# Patient Record
Sex: MALE | Race: AMERICAN INDIAN OR ALASKA NATIVE
[De-identification: names, ages, dates, MRNs, and addresses within clinical notes are randomized per-mention and may not be internally consistent; named-entity substitution may affect disease eponyms.]

---

## 2019-03-20 ENCOUNTER — HOSPITAL ENCOUNTER (EMERGENCY)
Dept: HOSPITAL 42 - ED | Age: 69
Discharge: HOME | End: 2019-03-20
Payer: MEDICARE

## 2019-03-20 VITALS
SYSTOLIC BLOOD PRESSURE: 165 MMHG | OXYGEN SATURATION: 97 % | RESPIRATION RATE: 18 BRPM | HEART RATE: 81 BPM | TEMPERATURE: 98.1 F | DIASTOLIC BLOOD PRESSURE: 87 MMHG

## 2019-03-20 VITALS — BODY MASS INDEX: 29.5 KG/M2

## 2019-03-20 DIAGNOSIS — R04.0: Primary | ICD-10-CM

## 2019-03-20 NOTE — ED PDOC
Arrival/HPI





- General


Chief Complaint: ENT Problem


Time Seen by Provider: 03/20/19 15:48


Historian: Patient





- History of Present Illness


Narrative History of Present Illness (Text): 





03/20/19 15:48 


Ishan Grullon is a 68 year old male, with a past medical history of CAD S/P CABG

36 years ago, multiple stents in the past several years, diabetes, hypertension,

and nosebleeds who presents to the emergency department complaining of nosebleed

since 1 hour. Patient appreciates majority of bleeding right in nostril. Patient

states he was sitting at his optometrist's office when nosebleed began after 

coughing. Patient notes dry and warm air in the office. Patient states current 

nosebleed is worse than previous. Patient currently takes clopidogrel and 

aspirin 81mg. Patient denies chest pain, shortness of breath, abdominal pain, 

nausea, vomiting, diarrhea, or any other complaint. 





Time/Duration: 1 hour


Symptom Course: Unchanged


Activities at Onset: Light


Context: Sitting, Other





Past Medical History





- Provider Review


Nursing Documentation Reviewed: Yes





- Infectious Disease


Hx of Infectious Diseases: None





- Cardiac


Hx MI: Yes


Hx Hypertension: Yes





- Neurological


Hx Seizures: No





- Endocrine/Metabolic


Hx Diabetes Mellitus Type 2: Yes





- Musculoskeletal/Rheumatological


Hx Falls: No





- Psychiatric


Hx Substance Use: No





- Surgical History


Hx Coronary Artery Bypass Graft: Yes


Other/Comment: Knee Sx





- Anesthesia


Hx Anesthesia: Yes





- Suicidal Assessment


Feels Threatened In Home Enviroment: No





Family/Social History





- Physician Review


Nursing Documentation Reviewed: Yes


Family/Social History: No Known Family HX


Smoking Status: Never Smoked


Hx Alcohol Use: No


Hx Substance Use: No





Allergies/Home Meds


Allergies/Adverse Reactions: 


Allergies





No Known Allergies Allergy (Verified 03/20/19 15:23)


   








Home Medications: 


                                    Home Meds











 Medication  Instructions  Recorded  Confirmed


 


Aspirin [Ecotrin] 81 mg PO DAILY 01/09/17 03/20/19


 


Clopidogrel [Plavix] 75 mg PO DAILY 01/09/17 03/20/19


 


Diltiazem HCl [Cardizem LA] 180 mg PO QPM 01/09/17 03/20/19


 


Enalapril Maleate [Vasotec] 20 mg PO BID 01/09/17 03/20/19


 


Metoprolol Succinate XL [Toprol XL] 50 mg PO BID 01/09/17 03/20/19


 


Rosuvastatin Calcium [Crestor] 10 mg PO QPM 01/09/17 03/20/19


 


metFORMIN [glucOPHAGE] 500 mg PO BID 01/09/17 03/20/19














Review of Systems





- Physician Review


All systems were reviewed & negative as marked: Yes





- Review of Systems


ENT: Epistaxis


Respiratory: absent: SOB


Cardiovascular: absent: Chest Pain





Physical Exam





- Physical Exam


Narrative Physical Exam (Text): 





03/20/19 15:48 


Constitutional: No acute distress.


Head: Normocephalic.  Atraumatic.  


Eyes:  PERRL.


ENT:  active bleeding in right nostril


Neck:  Supple.


Cardiovascular:  Regular rate.


Chest: No tenderness.


Respiratory:  Clear to auscultation bilaterally.


GI:  Soft. Nontender. Nondistended. 


Back:  No CVA tenderness.


Musculoskeletal:  No tenderness or swelling of extremities.


Skin:  No rash. 


Neurologic:  Alert, no focal deficit. 





Vital Signs Reviewed: Yes





Vital Signs











  Temp Pulse Resp BP Pulse Ox


 


 03/20/19 15:13  98.1 F  81  18  165/87 H  97











Temperature: Afebrile


Blood Pressure: Hypertensive


Pulse: Regular


Respiratory Rate: Normal


Appearance: Positive for: Well-Appearing, Non-Toxic, Comfortable


Pain Distress: None


Mental Status: Positive for: Alert and Oriented X 3





Medical Decision Making


ED Course and Treatment: 





03/20/19 15:48 


Impression: Patient is a 68 year old male with a past medical history of CABG, 

multiple stents, diabetes, hypertension, and nosebleeds who presents to the 

emergency department for nosebleed since one hour ago. Pt 











Progress Notes:


03/20/19 16:10


Bleeding relieved w/ pressure. Observe patient to follow up for any other 

bleeding. 





Bleeding continued. Rhinorocket placed. 





03/20/19 16:47


PROCEDURE: EPISTAXIS MANAGEMENT


Performed by the Pascual Quintero


Consent: Informed consent was obtained after discussion of the risks, benefits, 

and alternatives to


the procedure.


Timeout: A timeout to verify the correct patient, procedure, and site was 

performed immediately


prior to the procedure.


Indication: Nasal bleeding control


Location: right naris


Bleeding Source: ANTERIOR


Cautery: 


Packing: Rapid Rhino 4.5 cm, .75cc


Post-procedure: Good hemostasis. The patient was observed following procedure 

and no repeat


episode of bleeding was noted. Patient tolerated the procedure well with no 

immediate


complications.














- Scribe Statement


The provider has reviewed the documentation as recorded by the Scribe


Arthur Miller





All medical record entries made by the Scribe were at my direction and p

ersonally dictated by me. I have reviewed the chart and agree that the record 

accurately reflects my personal performance of the history, physical exam, 

medical decision making, and the department course for this patient. I have also

 personally directed, reviewed, and agree with the discharge instructions and 

disposition. 








Disposition/Present on Arrival





- Present on Arrival


Any Indicators Present on Arrival: No


History of DVT/PE: No


History of Uncontrolled Diabetes: No


Urinary Catheter: No


History of Decub. Ulcer: No


History Surgical Site Infection Following: CABG - Mediastinitis, None





- Disposition


Have Diagnosis and Disposition been Completed?: Yes


Diagnosis: 


 Epistaxis





Disposition: HOME/ ROUTINE


Disposition Time: 16:44


Patient Plan: Discharge


Condition: GOOD


Discharge Instructions (ExitCare):  Nosebleeds


Prescriptions: 


Acetaminophen [Tylenol 325mg tab] 2 tab PO Q4H #30 tab


Referrals: 


Al Torre DO [Staff Provider] - Follow up with primary


Forms:  [employer] (English)

## 2019-04-04 ENCOUNTER — HOSPITAL ENCOUNTER (EMERGENCY)
Dept: HOSPITAL 42 - ED | Age: 69
Discharge: HOME | End: 2019-04-04
Payer: MEDICARE

## 2019-04-04 VITALS
OXYGEN SATURATION: 97 % | DIASTOLIC BLOOD PRESSURE: 69 MMHG | HEART RATE: 72 BPM | RESPIRATION RATE: 18 BRPM | SYSTOLIC BLOOD PRESSURE: 131 MMHG

## 2019-04-04 VITALS — TEMPERATURE: 97.7 F

## 2019-04-04 VITALS — BODY MASS INDEX: 29.5 KG/M2

## 2019-04-04 DIAGNOSIS — I10: ICD-10-CM

## 2019-04-04 DIAGNOSIS — E78.5: ICD-10-CM

## 2019-04-04 DIAGNOSIS — I25.2: ICD-10-CM

## 2019-04-04 DIAGNOSIS — R04.0: Primary | ICD-10-CM

## 2019-04-04 DIAGNOSIS — E11.9: ICD-10-CM

## 2019-04-04 NOTE — ED PDOC
Arrival/HPI





- General


Chief Complaint: ENT Problem


Historian: Patient





- History of Present Illness


Narrative History of Present Illness (Text): 





04/04/19 21:51


69 y/o male, pmh including htn/hld/dm, nkda, c/o nose bleed about half hour ago 

and resolved.  Pt. stated that he would like to be discharged home, scheduled to

see the ENT, refused any nasal packing or cauterizing in the ER.  Pt. has no 

fall or trauma, no other medical or psychological complaints. 





Past Medical History





- Provider Review


Nursing Documentation Reviewed: Yes





- Infectious Disease


Hx of Infectious Diseases: None





- Cardiac


Hx MI: Yes


Hx Hypertension: Yes





- Neurological


Hx Seizures: No





- Endocrine/Metabolic


Hx Diabetes Mellitus Type 2: Yes





- Musculoskeletal/Rheumatological


Hx Falls: No





- Psychiatric


Hx Substance Use: No





- Surgical History


Hx Coronary Artery Bypass Graft: Yes


Other/Comment: Knee Sx





- Anesthesia


Hx Anesthesia: Yes





- Suicidal Assessment


Feels Threatened In Home Enviroment: No





Family/Social History





- Physician Review


Nursing Documentation Reviewed: Yes


Family/Social History: Unknown Family HX


Smoking Status: Never Smoked


Hx Alcohol Use: Yes


Frequency of alcohol use: Socially


Hx Substance Use: No





Allergies/Home Meds


Allergies/Adverse Reactions: 


Allergies





No Known Allergies Allergy (Verified 04/04/19 21:32)


   








Home Medications: 


                                    Home Meds











 Medication  Instructions  Recorded  Confirmed


 


Aspirin [Ecotrin] 81 mg PO DAILY 01/09/17 03/20/19


 


Clopidogrel [Plavix] 75 mg PO DAILY 01/09/17 03/20/19


 


Diltiazem HCl [Cardizem LA] 180 mg PO QPM 01/09/17 03/20/19


 


Enalapril Maleate [Vasotec] 20 mg PO BID 01/09/17 03/20/19


 


Metoprolol Succinate XL [Toprol XL] 50 mg PO BID 01/09/17 03/20/19


 


Rosuvastatin Calcium [Crestor] 10 mg PO QPM 01/09/17 03/20/19


 


metFORMIN [glucOPHAGE] 500 mg PO BID 01/09/17 03/20/19














Review of Systems





- Review of Systems


Constitutional: absent: Fatigue, Fevers


Eyes: absent: Vision Changes


ENT: absent: Hearing Changes


Respiratory: absent: SOB, Cough


Cardiovascular: absent: Chest Pain


Gastrointestinal: absent: Abdominal Pain, Nausea, Vomiting


Musculoskeletal: absent: Arthralgias, Back Pain


Skin: absent: Rash, Pruritis


Neurological: absent: Headache, Dizziness


Psychiatric: absent: Anxiety, Depression, Suicidal Ideation





Physical Exam


Vital Signs Reviewed: Yes





Vital Signs











  Temp Pulse Resp BP Pulse Ox


 


 04/04/19 21:25  97.7 F  72  18  131/69  97











Temperature: Afebrile


Blood Pressure: Normal


Pulse: Regular


Respiratory Rate: Normal


Appearance: Positive for: Well-Appearing, Non-Toxic, Comfortable


Pain Distress: None


Mental Status: Positive for: Alert and Oriented X 3





- Systems Exam


Head: Present: Atraumatic, Normocephalic


Pupils: Present: PERRL


Extroacular Muscles: Present: EOMI


Conjunctiva: Present: Normal


Mouth: Present: Moist Mucous Membranes


Nose (External): Present: Atraumatic.  No: Abrasion, Contusion, Laceration, 

Lesions


Nose (Internal): Present: Normal Inspection, No Active Bleeding.  No: 

Rhinorrhea, Septal Deviation, Septal Hematoma, Epistaxis


Neck: Present: Normal Range of Motion


Respiratory/Chest: Present: Clear to Auscultation, Good Air Exchange.  No: 

Respiratory Distress, Accessory Muscle Use


Cardiovascular: Present: Regular Rate and Rhythm, Normal S1, S2.  No: Murmurs


Abdomen: No: Tenderness, Distention, Peritoneal Signs


Back: Present: Normal Inspection


Upper Extremity: Present: Normal Inspection.  No: Cyanosis, Edema


Lower Extremity: Present: Normal Inspection.  No: Edema


Neurological: Present: GCS=15, CN II-XII Intact, Speech Normal


Skin: Present: Warm, Dry, Normal Color.  No: Rashes


Psychiatric: Present: Alert, Oriented x 3, Normal Insight, Normal Concentration





Medical Decision Making


ED Course and Treatment: 





04/04/19 21:56


-There is no active bleeding, pt. request to be discharged home, will discharge 

home.


-Discharge home with education on follow up with your own pmd and ENT within 2 

days, return to the ER for any new or worsening signs or symptoms. 





- PA / NP / Resident Statement


MD/DO has reviewed & agrees with the documentation as recorded.





Disposition/Present on Arrival





- Present on Arrival


Any Indicators Present on Arrival: No


History of DVT/PE: No


History of Uncontrolled Diabetes: No


Urinary Catheter: No


History of Decub. Ulcer: No


History Surgical Site Infection Following: None





- Disposition


Have Diagnosis and Disposition been Completed?: Yes


Diagnosis: 


 Epistaxis





Disposition: HOME/ ROUTINE


Disposition Time: 21:57


Patient Plan: Discharge


Condition: IMPROVED


Additional Instructions: 


Discharge home with education on follow up with your own pmd and ENT within 2 

days, return to the ER for any new or worsening signs or symptoms. 


Referrals: 


Roberto Silva DO [Staff Provider] - Follow up with primary


Forms:  [employer] (English)